# Patient Record
Sex: FEMALE | Race: WHITE | Employment: FULL TIME | URBAN - METROPOLITAN AREA
[De-identification: names, ages, dates, MRNs, and addresses within clinical notes are randomized per-mention and may not be internally consistent; named-entity substitution may affect disease eponyms.]

---

## 2017-02-06 PROCEDURE — 83520 IMMUNOASSAY QUANT NOS NONAB: CPT | Performed by: INTERNAL MEDICINE

## 2017-02-06 PROCEDURE — 36415 COLL VENOUS BLD VENIPUNCTURE: CPT | Performed by: INTERNAL MEDICINE

## 2017-03-10 PROCEDURE — 83520 IMMUNOASSAY QUANT NOS NONAB: CPT | Performed by: INTERNAL MEDICINE

## 2017-03-10 PROCEDURE — 84445 ASSAY OF TSI GLOBULIN: CPT | Performed by: INTERNAL MEDICINE

## 2017-04-14 ENCOUNTER — OFFICE VISIT (OUTPATIENT)
Dept: OBGYN CLINIC | Facility: CLINIC | Age: 42
End: 2017-04-14

## 2017-04-14 VITALS
BODY MASS INDEX: 21 KG/M2 | WEIGHT: 123 LBS | SYSTOLIC BLOOD PRESSURE: 134 MMHG | HEIGHT: 64 IN | HEART RATE: 54 BPM | DIASTOLIC BLOOD PRESSURE: 84 MMHG

## 2017-04-14 DIAGNOSIS — Z12.31 ENCOUNTER FOR SCREENING MAMMOGRAM FOR BREAST CANCER: ICD-10-CM

## 2017-04-14 DIAGNOSIS — Z30.433 ENCOUNTER FOR REMOVAL AND REINSERTION OF INTRAUTERINE CONTRACEPTIVE DEVICE: ICD-10-CM

## 2017-04-14 DIAGNOSIS — Z32.00 UNCONFIRMED PREGNANCY: Primary | ICD-10-CM

## 2017-04-14 PROCEDURE — 58301 REMOVE INTRAUTERINE DEVICE: CPT | Performed by: OBSTETRICS & GYNECOLOGY

## 2017-04-14 PROCEDURE — 58300 INSERT INTRAUTERINE DEVICE: CPT | Performed by: OBSTETRICS & GYNECOLOGY

## 2017-04-14 PROCEDURE — 81025 URINE PREGNANCY TEST: CPT | Performed by: OBSTETRICS & GYNECOLOGY

## 2017-04-14 RX ORDER — COPPER 313.4 MG/1
1 INTRAUTERINE DEVICE INTRAUTERINE ONCE
Status: SHIPPED | OUTPATIENT
Start: 2017-04-14

## 2017-04-14 NOTE — PROCEDURES
IUD Insertion     Pregnancy Results: negative from urine test   Birth control method(s) used:  ; date last used:  Paragard IUD    Consent signed.   Procedure discussed with the patient in detail including indication, risks, benefits, alternatives and compli

## 2017-04-14 NOTE — PROCEDURES
IUD Removal     Pregnancy Results: negative from urine test   Birth control method(s) used:  ; date last used:  Paragard IUD  Consent signed.   Procedure discussed with the patient in detail including indication, risks, benefits, alternatives and complicati

## 2017-05-18 ENCOUNTER — OFFICE VISIT (OUTPATIENT)
Dept: OBGYN CLINIC | Facility: CLINIC | Age: 42
End: 2017-05-18

## 2017-05-18 ENCOUNTER — HOSPITAL ENCOUNTER (OUTPATIENT)
Dept: MAMMOGRAPHY | Facility: HOSPITAL | Age: 42
Discharge: HOME | End: 2017-05-18
Attending: OBSTETRICS & GYNECOLOGY
Payer: COMMERCIAL

## 2017-05-18 VITALS — WEIGHT: 123 LBS | BODY MASS INDEX: 21 KG/M2 | DIASTOLIC BLOOD PRESSURE: 76 MMHG | SYSTOLIC BLOOD PRESSURE: 121 MMHG

## 2017-05-18 DIAGNOSIS — Z30.431 IUD CHECK UP: Primary | ICD-10-CM

## 2017-05-18 DIAGNOSIS — Z12.31 ENCOUNTER FOR SCREENING MAMMOGRAM FOR BREAST CANCER: ICD-10-CM

## 2017-05-18 PROCEDURE — 77067 SCR MAMMO BI INCL CAD: CPT | Performed by: OBSTETRICS & GYNECOLOGY

## 2017-05-18 PROCEDURE — 99213 OFFICE O/P EST LOW 20 MIN: CPT | Performed by: OBSTETRICS & GYNECOLOGY

## 2017-05-18 NOTE — PROGRESS NOTES
HPI:    Patient ID: oJse Dukes is a 39year old female. HPI  Patient here for Paragard IUD check. No C/Os. Reports menses slightly heavier but not a concern for the patient. Review of Systems   Constitutional: Negative.     Respiratory: Ne

## 2017-05-26 PROBLEM — Z92.3 S/P RADIOACTIVE IODINE THYROID ABLATION: Status: ACTIVE | Noted: 2017-05-26

## 2017-06-24 ENCOUNTER — TELEPHONE (OUTPATIENT)
Dept: OBGYN CLINIC | Facility: CLINIC | Age: 42
End: 2017-06-24

## 2017-06-24 NOTE — TELEPHONE ENCOUNTER
I recommend warm soaks twice a day. It sounds like an ingrown hair.   If does not resolve in three or four days then patient should come in for exam.

## 2017-06-24 NOTE — TELEPHONE ENCOUNTER
Noticed 2 days ago, 1 reddened \"bump\" on the inner right labia area. Pt voices the bump went away, but has now returned. Pt voices area is tender, no drainage, or any other symptoms.  Pt shaves her labial area and is not sure if it could be a ingrown hair

## 2017-06-26 ENCOUNTER — OFFICE VISIT (OUTPATIENT)
Dept: OBGYN CLINIC | Facility: CLINIC | Age: 42
End: 2017-06-26

## 2017-06-26 VITALS — SYSTOLIC BLOOD PRESSURE: 126 MMHG | WEIGHT: 126 LBS | BODY MASS INDEX: 22 KG/M2 | DIASTOLIC BLOOD PRESSURE: 85 MMHG

## 2017-06-26 DIAGNOSIS — L73.9 FOLLICULITIS: Primary | ICD-10-CM

## 2017-06-26 PROCEDURE — 99213 OFFICE O/P EST LOW 20 MIN: CPT | Performed by: ADVANCED PRACTICE MIDWIFE

## 2017-06-26 RX ORDER — RIZATRIPTAN BENZOATE 10 MG/1
TABLET ORAL
Refills: 1 | COMMUNITY
Start: 2017-05-04 | End: 2018-04-02

## 2017-06-26 RX ORDER — LEVOTHYROXINE SODIUM 137 MCG
TABLET ORAL
COMMUNITY
Start: 2017-03-29 | End: 2018-04-02

## 2017-06-26 NOTE — TELEPHONE ENCOUNTER
Pt voices bump has gotten smaller, but is \"itchy\". Pt made an  appointment today at 4:45 pm with [facility] to have area examined.

## 2017-06-26 NOTE — PROGRESS NOTES
S.  Has a small bump inside the upper part of the left labia. It has been coming and going since last Tuesday. Is painful if touched. Now there is itching. No vaginal discharge. Currently sexually active.   Has copper IUD  Takes Synthroid daily  O.  Vu

## 2017-07-22 PROCEDURE — 83520 IMMUNOASSAY QUANT NOS NONAB: CPT | Performed by: INTERNAL MEDICINE

## 2017-12-26 PROCEDURE — 83520 IMMUNOASSAY QUANT NOS NONAB: CPT | Performed by: INTERNAL MEDICINE

## 2018-04-02 PROCEDURE — 83520 IMMUNOASSAY QUANT NOS NONAB: CPT | Performed by: INTERNAL MEDICINE

## 2018-04-07 PROBLEM — L73.9 FOLLICULITIS: Status: RESOLVED | Noted: 2017-06-26 | Resolved: 2018-04-07

## 2018-04-07 PROBLEM — G43.009 MIGRAINE WITHOUT AURA AND WITHOUT STATUS MIGRAINOSUS, NOT INTRACTABLE: Status: ACTIVE | Noted: 2018-04-07

## 2018-04-07 PROBLEM — Z92.3 S/P RADIOACTIVE IODINE THYROID ABLATION: Status: RESOLVED | Noted: 2017-05-26 | Resolved: 2018-04-07

## 2018-04-30 PROBLEM — J30.9 ALLERGIC RHINITIS: Status: ACTIVE | Noted: 2018-04-30

## 2018-07-31 ENCOUNTER — APPOINTMENT (OUTPATIENT)
Dept: LAB | Facility: HOSPITAL | Age: 43
End: 2018-07-31
Attending: OBSTETRICS & GYNECOLOGY
Payer: COMMERCIAL

## 2018-07-31 ENCOUNTER — TELEPHONE (OUTPATIENT)
Dept: OBGYN CLINIC | Facility: CLINIC | Age: 43
End: 2018-07-31

## 2018-07-31 DIAGNOSIS — R30.0 DYSURIA: ICD-10-CM

## 2018-07-31 DIAGNOSIS — R30.0 DYSURIA: Primary | ICD-10-CM

## 2018-07-31 LAB
BILIRUB UR QL: NEGATIVE
CLARITY UR: CLEAR
GLUCOSE UR-MCNC: NEGATIVE MG/DL
HGB UR QL STRIP.AUTO: NEGATIVE
KETONES UR-MCNC: NEGATIVE MG/DL
NITRITE UR QL STRIP.AUTO: POSITIVE
PH UR: 6 [PH] (ref 5–8)
PROT UR-MCNC: NEGATIVE MG/DL
RBC #/AREA URNS AUTO: 1 /HPF
SP GR UR STRIP: 1.01 (ref 1–1.03)
UROBILINOGEN UR STRIP-ACNC: 4
VIT C UR-MCNC: NEGATIVE MG/DL
WBC #/AREA URNS AUTO: 13 /HPF

## 2018-07-31 PROCEDURE — 87086 URINE CULTURE/COLONY COUNT: CPT

## 2018-07-31 PROCEDURE — 87186 SC STD MICRODIL/AGAR DIL: CPT

## 2018-07-31 PROCEDURE — 87088 URINE BACTERIA CULTURE: CPT

## 2018-07-31 PROCEDURE — 81001 URINALYSIS AUTO W/SCOPE: CPT

## 2018-07-31 RX ORDER — NITROFURANTOIN 25; 75 MG/1; MG/1
100 CAPSULE ORAL 2 TIMES DAILY
Qty: 10 CAPSULE | Refills: 0 | Status: SHIPPED | OUTPATIENT
Start: 2018-07-31 | End: 2018-08-05

## 2018-07-31 NOTE — TELEPHONE ENCOUNTER
May send in Macrobid 100 mg BID x 5 days. Send patient to lab for a U/A C & S prior to starting antibiotic. Diagnosis is Dysuria.

## 2018-07-31 NOTE — TELEPHONE ENCOUNTER
Orders entered. This RN notified patient of plan of care. Pt verbalizes understanding. Will complete labs today.

## 2018-07-31 NOTE — TELEPHONE ENCOUNTER
Pt started having UTI symptoms on Saturday. Pt having frequency and burning. No hematuria or fever. Please advise.

## 2018-08-01 ENCOUNTER — TELEPHONE (OUTPATIENT)
Dept: OBGYN CLINIC | Facility: CLINIC | Age: 43
End: 2018-08-01

## 2018-08-01 NOTE — TELEPHONE ENCOUNTER
----- Message from Emil Jessica MD sent at 7/31/2018  9:27 PM CDT -----  U/A c/w a UTI. Notify patient. Lashawn Ambrosio already called in. Urine culture pending.

## 2018-08-07 ENCOUNTER — TELEPHONE (OUTPATIENT)
Dept: OBGYN CLINIC | Facility: CLINIC | Age: 43
End: 2018-08-07

## 2018-08-25 PROCEDURE — 83520 IMMUNOASSAY QUANT NOS NONAB: CPT | Performed by: INTERNAL MEDICINE

## 2018-10-24 ENCOUNTER — TELEPHONE (OUTPATIENT)
Dept: OBGYN CLINIC | Facility: CLINIC | Age: 43
End: 2018-10-24

## 2018-10-24 DIAGNOSIS — R30.0 DYSURIA: Primary | ICD-10-CM

## 2018-10-24 RX ORDER — CIPROFLOXACIN 500 MG/1
500 TABLET, FILM COATED ORAL 2 TIMES DAILY
Qty: 10 TABLET | Refills: 0 | Status: SHIPPED | OUTPATIENT
Start: 2018-10-24 | End: 2018-12-04

## 2018-10-24 NOTE — TELEPHONE ENCOUNTER
Per pt states that she has had urinary symptoms since Sunday. States she has a lot of pressure and dysuria. Was taking Azo and this didn't seem to help her symptoms.      Pt states that in July she had an allergic reaction to macrobid that was prescribed fo

## 2018-10-24 NOTE — TELEPHONE ENCOUNTER
Please have patient get a urinalysis and urine culture with sensitivities. She may do this at an [facility] lab or [address] [place] [address] lab depending on which is easier for her. We then will have her begin Cipro 500 mg twice daily times 5 days.

## 2018-11-28 ENCOUNTER — TELEPHONE (OUTPATIENT)
Dept: OBGYN CLINIC | Facility: CLINIC | Age: 43
End: 2018-11-28

## 2018-11-28 NOTE — TELEPHONE ENCOUNTER
Pt never got her U/A C&S done due to possible UTI symptoms on 10/24. Pt was treated with Cipro 500 for five days. Spoke with pt and verbalized that treatment helped with her symptoms. Ok to cancel lab orders?  Pls advise

## 2018-11-30 ENCOUNTER — OFFICE VISIT (OUTPATIENT)
Dept: OBGYN CLINIC | Facility: CLINIC | Age: 43
End: 2018-11-30
Payer: COMMERCIAL

## 2018-11-30 VITALS
RESPIRATION RATE: 18 BRPM | SYSTOLIC BLOOD PRESSURE: 122 MMHG | HEART RATE: 80 BPM | DIASTOLIC BLOOD PRESSURE: 80 MMHG | WEIGHT: 131.63 LBS | BODY MASS INDEX: 23 KG/M2

## 2018-11-30 DIAGNOSIS — Z12.31 SCREENING MAMMOGRAM, ENCOUNTER FOR: Primary | ICD-10-CM

## 2018-11-30 DIAGNOSIS — B37.3 CANDIDA VAGINITIS: ICD-10-CM

## 2018-11-30 DIAGNOSIS — Z01.411 ENCOUNTER FOR GYNECOLOGICAL EXAMINATION WITH ABNORMAL FINDING: ICD-10-CM

## 2018-11-30 PROCEDURE — 99396 PREV VISIT EST AGE 40-64: CPT | Performed by: OBSTETRICS & GYNECOLOGY

## 2018-11-30 RX ORDER — FLUCONAZOLE 150 MG/1
150 TABLET ORAL ONCE
Qty: 1 TABLET | Refills: 0 | Status: SHIPPED | OUTPATIENT
Start: 2018-11-30 | End: 2018-11-30

## 2018-11-30 NOTE — PROGRESS NOTES
HPI:    Patient ID: Brett Aguilar is a 37year old female. Patient here for routine exam.  Paragard IUD in place since 4/2017. Reports vaginal discharge with itching. Needs mammogram order. Review of Systems   Constitutional: Negative. masses. No nipple discharge. No adenopathy. Abdominal: Soft. Bowel sounds are normal. She exhibits no mass. There is no tenderness. Genitourinary: Uterus normal. No vaginal discharge found.    Genitourinary Comments: Vagina:  Cheesy white discharge c/

## 2018-12-03 ENCOUNTER — TELEPHONE (OUTPATIENT)
Dept: OBGYN CLINIC | Facility: CLINIC | Age: 43
End: 2018-12-03

## 2018-12-04 ENCOUNTER — OFFICE VISIT (OUTPATIENT)
Dept: OBGYN CLINIC | Facility: CLINIC | Age: 43
End: 2018-12-04
Payer: COMMERCIAL

## 2018-12-04 VITALS
WEIGHT: 132 LBS | SYSTOLIC BLOOD PRESSURE: 141 MMHG | HEART RATE: 75 BPM | BODY MASS INDEX: 23 KG/M2 | DIASTOLIC BLOOD PRESSURE: 89 MMHG

## 2018-12-04 DIAGNOSIS — N90.89 VULVAR IRRITATION: Primary | ICD-10-CM

## 2018-12-04 PROCEDURE — 87210 SMEAR WET MOUNT SALINE/INK: CPT | Performed by: OBSTETRICS & GYNECOLOGY

## 2018-12-04 PROCEDURE — 99213 OFFICE O/P EST LOW 20 MIN: CPT | Performed by: OBSTETRICS & GYNECOLOGY

## 2018-12-04 NOTE — PROGRESS NOTES
[facility]  Obstetrics and Gynecology  Focused Gynecology Problem Exam  Cam Davis MD    Voncille Litten is a 37year old female presenting for Follow - Up (Itching on the outer vaginal area, pt denies any discharge.)  .     HPI:   Patient pres Problem Relation Age of Onset   • Hypertension Father    • Hypertension Mother    • Other (Other) Mother         hyperthyroidism   • Cancer Maternal Grandmother         skin ca   • Heart Disorder Maternal Grandfather    • Cancer Maternal Grandfather Genitourinary: Vagina normal. There is rash and lesion on the right labia. There is no tenderness or injury on the right labia. There is rash and lesion on the left labia. There is no tenderness or injury on the left labia. Cervix exhibits no discharge. Fluticasone Propionate 50 MCG/ACT Nasal Suspension 2 sprays by Each Nare route daily.  Disp: 1 Bottle Rfl: 1   Rizatriptan Benzoate 10 MG Oral Tablet Dispersible DISSOLVE 1 TABLET UNDER THE TONGUE AS NEEDED FOR MIGRAINE Disp: 9 tablet Rfl: 5     Allergies:

## 2019-01-21 PROCEDURE — 83520 IMMUNOASSAY QUANT NOS NONAB: CPT | Performed by: INTERNAL MEDICINE

## 2019-09-14 PROCEDURE — 83520 IMMUNOASSAY QUANT NOS NONAB: CPT | Performed by: INTERNAL MEDICINE

## 2019-09-14 PROCEDURE — 36415 COLL VENOUS BLD VENIPUNCTURE: CPT | Performed by: INTERNAL MEDICINE

## 2020-01-22 ENCOUNTER — HOSPITAL ENCOUNTER (OUTPATIENT)
Dept: MAMMOGRAPHY | Facility: HOSPITAL | Age: 45
Discharge: HOME | End: 2020-01-22
Attending: OBSTETRICS & GYNECOLOGY
Payer: COMMERCIAL

## 2020-01-22 DIAGNOSIS — Z12.31 ENCOUNTER FOR SCREENING MAMMOGRAM FOR MALIGNANT NEOPLASM OF BREAST: ICD-10-CM

## 2020-01-22 PROCEDURE — 77063 BREAST TOMOSYNTHESIS BI: CPT | Performed by: OBSTETRICS & GYNECOLOGY

## 2020-01-22 PROCEDURE — 77067 SCR MAMMO BI INCL CAD: CPT | Performed by: OBSTETRICS & GYNECOLOGY

## 2020-12-22 ENCOUNTER — TELEPHONE (OUTPATIENT)
Dept: OBGYN CLINIC | Facility: CLINIC | Age: 45
End: 2020-12-22

## 2020-12-22 RX ORDER — FLUCONAZOLE 150 MG/1
TABLET ORAL
Qty: 1 TABLET | Refills: 1 | Status: SHIPPED | OUTPATIENT
Start: 2020-12-22 | End: 2021-03-12

## 2020-12-22 RX ORDER — CLOTRIMAZOLE AND BETAMETHASONE DIPROPIONATE 10; .64 MG/G; MG/G
1 CREAM TOPICAL 2 TIMES DAILY
Qty: 45 G | Refills: 0 | Status: SHIPPED | OUTPATIENT
Start: 2020-12-22 | End: 2021-03-12

## 2020-12-22 NOTE — TELEPHONE ENCOUNTER
Pt Name and  verified. Pt is calling in regards to possible yeast infection. Pt has had some itching and external vaginal area has some redness. Is feeling uncomfortable. Pt has some d/c but nothing excessive.  pls advise

## 2021-03-12 ENCOUNTER — HOSPITAL ENCOUNTER (OUTPATIENT)
Dept: MAMMOGRAPHY | Age: 46
Discharge: HOME | End: 2021-03-12
Attending: OBSTETRICS & GYNECOLOGY
Payer: COMMERCIAL

## 2021-03-12 ENCOUNTER — OFFICE VISIT (OUTPATIENT)
Dept: OBGYN CLINIC | Facility: CLINIC | Age: 46
End: 2021-03-12
Payer: COMMERCIAL

## 2021-03-12 VITALS — DIASTOLIC BLOOD PRESSURE: 82 MMHG | WEIGHT: 125.19 LBS | BODY MASS INDEX: 22 KG/M2 | SYSTOLIC BLOOD PRESSURE: 120 MMHG

## 2021-03-12 DIAGNOSIS — Z12.31 ENCOUNTER FOR SCREENING MAMMOGRAM FOR MALIGNANT NEOPLASM OF BREAST: ICD-10-CM

## 2021-03-12 DIAGNOSIS — Z01.419 ENCOUNTER FOR WELL WOMAN EXAM WITH ROUTINE GYNECOLOGICAL EXAM: Primary | ICD-10-CM

## 2021-03-12 DIAGNOSIS — Z01.419 ENCOUNTER FOR GYNECOLOGICAL EXAMINATION WITHOUT ABNORMAL FINDING: ICD-10-CM

## 2021-03-12 PROCEDURE — 3079F DIAST BP 80-89 MM HG: CPT | Performed by: OBSTETRICS & GYNECOLOGY

## 2021-03-12 PROCEDURE — 3074F SYST BP LT 130 MM HG: CPT | Performed by: OBSTETRICS & GYNECOLOGY

## 2021-03-12 PROCEDURE — 99396 PREV VISIT EST AGE 40-64: CPT | Performed by: OBSTETRICS & GYNECOLOGY

## 2021-03-12 NOTE — PROGRESS NOTES
HPI/Subjective:   Patient ID: Sheryle Negus is a 39year old female. Patient here for routine exam.  Reports small white dot on left nipple. Denies any discomfort or discharge. Needs pap smear today. Paragrd IUD in place since 2017.   Recently re Tenderness: There is no abdominal tenderness. Genitourinary:     Vagina: Normal. No vaginal discharge. Comments: Cervix:  No CMT. No lesions. Paragard IUD strings visible. Uterus:  Small, NT and AV. Adnexa:  No adnexal masses. NT.    Muscul

## 2021-03-15 LAB — HPV I/H RISK 1 DNA SPEC QL NAA+PROBE: NEGATIVE

## 2021-04-05 PROBLEM — S66.801A: Status: ACTIVE | Noted: 2021-04-05

## 2022-01-27 ENCOUNTER — OFFICE VISIT (OUTPATIENT)
Dept: OBGYN CLINIC | Facility: CLINIC | Age: 47
End: 2022-01-27
Payer: COMMERCIAL

## 2022-01-27 VITALS
HEIGHT: 64 IN | DIASTOLIC BLOOD PRESSURE: 68 MMHG | SYSTOLIC BLOOD PRESSURE: 118 MMHG | BODY MASS INDEX: 20.76 KG/M2 | WEIGHT: 121.63 LBS

## 2022-01-27 DIAGNOSIS — Z01.411 ENCOUNTER FOR GYNECOLOGICAL EXAMINATION WITH ABNORMAL FINDING: ICD-10-CM

## 2022-01-27 DIAGNOSIS — D25.9 UTERINE LEIOMYOMA, UNSPECIFIED LOCATION: Primary | ICD-10-CM

## 2022-01-27 DIAGNOSIS — Z30.432 ENCOUNTER FOR REMOVAL OF INTRAUTERINE CONTRACEPTIVE DEVICE: ICD-10-CM

## 2022-01-27 PROCEDURE — 3078F DIAST BP <80 MM HG: CPT | Performed by: OBSTETRICS & GYNECOLOGY

## 2022-01-27 PROCEDURE — 3074F SYST BP LT 130 MM HG: CPT | Performed by: OBSTETRICS & GYNECOLOGY

## 2022-01-27 PROCEDURE — 3008F BODY MASS INDEX DOCD: CPT | Performed by: OBSTETRICS & GYNECOLOGY

## 2022-01-27 PROCEDURE — 58301 REMOVE INTRAUTERINE DEVICE: CPT | Performed by: OBSTETRICS & GYNECOLOGY

## 2022-01-27 PROCEDURE — 99396 PREV VISIT EST AGE 40-64: CPT | Performed by: OBSTETRICS & GYNECOLOGY

## 2022-01-27 NOTE — PROGRESS NOTES
Subjective:   Patient ID: Minor Conte is a 55year old female. Patient here for routine exam.  Reports heavy periods lately and desires Paragard IUD removed. Patient is not sexually active but encouraged condoms if becomes so.   Patient expresses Tenderness: There is no abdominal tenderness. Genitourinary:     General: Normal vulva. Vagina: Normal. No vaginal discharge. Comments: Cervix:  No CMT. No lesions. Paragard IUD strings visible. Uterus:  Enlarged, irregular.   Size:  ~12-14 w

## 2022-01-27 NOTE — PROCEDURES
IUD Removal     Pregnancy Results: negative from urine test   Birth control method(s) used:  Paragard IUD  Consent signed. Procedure discussed with the patient in detail including indication, risks, benefits, alternatives and complications.     Pelvic Exam

## 2022-01-28 ENCOUNTER — HOSPITAL ENCOUNTER (OUTPATIENT)
Dept: ULTRASOUND IMAGING | Age: 47
Discharge: HOME | End: 2022-01-28
Attending: OBSTETRICS & GYNECOLOGY
Payer: COMMERCIAL

## 2022-01-28 DIAGNOSIS — D25.9 UTERINE LEIOMYOMA, UNSPECIFIED LOCATION: ICD-10-CM

## 2022-01-28 PROCEDURE — 76830 TRANSVAGINAL US NON-OB: CPT | Performed by: OBSTETRICS & GYNECOLOGY

## 2022-01-28 PROCEDURE — 76856 US EXAM PELVIC COMPLETE: CPT | Performed by: OBSTETRICS & GYNECOLOGY

## 2023-12-26 ENCOUNTER — E-VISIT (OUTPATIENT)
Dept: TELEHEALTH | Age: 48
End: 2023-12-26
Payer: COMMERCIAL

## 2023-12-26 ENCOUNTER — TELEMEDICINE (OUTPATIENT)
Dept: TELEHEALTH | Age: 48
End: 2023-12-26
Payer: COMMERCIAL

## 2023-12-26 DIAGNOSIS — J01.00 ACUTE NON-RECURRENT MAXILLARY SINUSITIS: Primary | ICD-10-CM

## 2023-12-26 PROCEDURE — 99203 OFFICE O/P NEW LOW 30 MIN: CPT | Performed by: NURSE PRACTITIONER

## 2023-12-26 PROCEDURE — 99421 OL DIG E/M SVC 5-10 MIN: CPT | Performed by: NURSE PRACTITIONER

## 2023-12-26 RX ORDER — AMOXICILLIN AND CLAVULANATE POTASSIUM 875; 125 MG/1; MG/1
1 TABLET, FILM COATED ORAL 2 TIMES DAILY WITH MEALS
Qty: 20 TABLET | Refills: 0 | Status: SHIPPED | OUTPATIENT
Start: 2023-12-26 | End: 2024-01-05

## 2023-12-26 RX ORDER — ESCITALOPRAM OXALATE 20 MG/1
20 TABLET ORAL DAILY
COMMUNITY
Start: 2023-11-08

## 2023-12-26 RX ORDER — FLUTICASONE PROPIONATE 50 MCG
2 SPRAY, SUSPENSION (ML) NASAL DAILY
Qty: 1 EACH | Refills: 0 | Status: SHIPPED | OUTPATIENT
Start: 2023-12-26

## 2023-12-26 RX ORDER — LEVOTHYROXINE SODIUM 112 MCG
112 TABLET ORAL
COMMUNITY
Start: 2023-12-19

## (undated) NOTE — MR AVS SNAPSHOT
[facility]  [address] [place] [id number] [phone]               Thank you for choosing us for your health care visit with Durrell Kanner.  Joann Cerrato MD.  We are glad to serve you and happy to provide you with this summary of your visit This list is accurate as of: 5/18/17  9:41 AM.  Always use your most recent med list.                Rizatriptan Benzoate 10 MG Tbdp   Take 1 tablet (10 mg total) by mouth as needed for Migraine.    Commonly known as:  MAXALT-MLT           SYNTHROID 137 MCG

## (undated) NOTE — LETTER
7/3/2019              Jenny Marshall        [address] [id number]         Dear Stas Peralta records indicate that the mammogram test ordered for you by Aren Franz. Ravindra Null MD  has not been done.   If you have, in fact, alre

## (undated) NOTE — Clinical Note
AUTHORIZATION FOR SURGICAL OPERATION OR OTHER PROCEDURE    1.  I hereby [facility]. Blessing Loyola[facility] to my case to perform the following operation and/or procedure at the [facility]:    _________________________ Patient signature:  ___________________________________________________             Relationship to Patient:             Parent    Responsible person                            Spouse  In case of minor or                     Other  _____________   Incompet

## (undated) NOTE — MR AVS SNAPSHOT
[facility]  [address] [place] [address]  [phone]               Thank you for choosing us for your health care visit with Reanna Allan.  Swapna Durant MD.  We are glad to serve you and happy to provide you with this summary of your visit It is the patient's responsibility to check with and follow their insurance company's guidelines for prior authorization for this test.  You may be held responsible for payment in full if proper authorization is not acquired.   Please contact the [facility] office, you can view your past visit information in [employer] by going to Visits < Visit Summaries. [employer] questions? Call [phone] for help. [employer] is NOT to be used for urgent needs. For medical emergencies, dial 911.            Visit [facility]

## (undated) NOTE — LETTER
AUTHORIZATION FOR SURGICAL OPERATION OR OTHER PROCEDURE    1. I hereby authorize Dr. Michael Worley, and [address] [place] [address] staff assigned to my case to perform the following operation and/or procedure at the [address] [place] [address]:  IUD removal      2.   My physician to Patient:           []  Parent    Responsible person                          []  Spouse  In case of minor or                    [] Other  _____________   Incompetent name:  __________________________________________________